# Patient Record
Sex: FEMALE | Race: WHITE | Employment: UNEMPLOYED | URBAN - METROPOLITAN AREA
[De-identification: names, ages, dates, MRNs, and addresses within clinical notes are randomized per-mention and may not be internally consistent; named-entity substitution may affect disease eponyms.]

---

## 2024-04-30 ENCOUNTER — HOSPITAL ENCOUNTER (EMERGENCY)
Age: 24
Discharge: HOME | End: 2024-04-30

## 2024-04-30 VITALS
HEART RATE: 89 BPM | TEMPERATURE: 98.1 F | HEIGHT: 62 IN | OXYGEN SATURATION: 97 % | BODY MASS INDEX: 32.11 KG/M2 | DIASTOLIC BLOOD PRESSURE: 87 MMHG | SYSTOLIC BLOOD PRESSURE: 146 MMHG | WEIGHT: 174.5 LBS | RESPIRATION RATE: 20 BRPM

## 2024-04-30 DIAGNOSIS — S61.451A CAT BITE OF RIGHT HAND, INITIAL ENCOUNTER: Primary | ICD-10-CM
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
## 2024-04-30 PROCEDURE — 99282 EMERGENCY DEPT VISIT SF MDM: CPT

## 2024-04-30 ASSESSMENT — PAIN - FUNCTIONAL ASSESSMENT: PAIN_FUNCTIONAL_ASSESSMENT: NONE - DENIES PAIN

## 2024-04-30 NOTE — ED PROVIDER NOTES
[facility] EMERGENCY DEPARTMENT  EMERGENCY DEPARTMENT ENCOUNTER      Pt Name: Nany Lam  MRN: [id number]  Birthdate 2000  Date of evaluation: 4/30/2024  Provider: JUJU Wasserman  PCP: No primary care provider on file.  Note Started: 7:34 PM EDT     The ED Attending Physician was available for consultation but did not see or evaluate this patient.    CHIEF COMPLAINT       Chief Complaint   Patient presents with    Animal Bite     cat       HISTORY OF PRESENT ILLNESS   (Location, Timing/Onset, Context/Setting, Quality, Duration, Modifying Factors, Severity, Associated Signs and Symptoms)  Note limiting factors.     Nany Lam is a 24 y.o. female who presents with report of cat bite to the left hand.  Patient says this occurred yesterday, by a domestic cat in the home of someone she had just met, a friend of a friend.  She says the cat appeared friendly but then bit her on the hand when she went to pet it.  She says the cat did not appear ill, but the owner reported that the cat has not had vaccinations.  She says today she went to an urgent care center and she was advised by the provider there to come to the ED to get rabies vaccination.  She says that at that visit she got a tetanus vaccination and was prescribed antibiotics.  Patient says she has never had rabies vaccination herself.  She denies feeling generally sick or feverish.  Denies injury to any other parts of the body.  Says there was no significant bleeding from the wound.  She denies significant swelling or restriction of movement in the hand or wrist.  No known medical problems.  Says there is no chance of pregnancy.    Nursing Notes were all reviewed and agreed with or any disagreements were addressed in the HPI.    REVIEW OF SYSTEMS    (2-9 systems for level 4, 10 or more for level 5)     Positives and pertinent negatives as per HPI.     PAST MEDICAL HISTORY   History reviewed. No pertinent past medical history.    SURGICAL

## 2024-04-30 NOTE — DISCHARGE INSTRUCTIONS
The cat that bit you must be quarantined and observed for a period of 10 days, with the day of the injury being day 0, the following day being day 1, etc. If no signs of illness appear in the animal during that time, then there is no indication for rabies prophylaxis treatment. If the animal does become ill, return to this or any other emergency department promptly to begin prophylaxis treatment, and the animal should be promptly assessed by a .

## 2024-04-30 NOTE — ED TRIAGE NOTES
25y/o female presents to the ED with cat bite and scratches on left hand injury occurred yesterday. Pt states it was a friends unvaccinated cat. No bleeding or deformity. +swelling. She was seen by urgent care stated that she would need rabies. Pt was prescribed antibiotics.